# Patient Record
Sex: FEMALE | Race: WHITE | Employment: OTHER | URBAN - METROPOLITAN AREA
[De-identification: names, ages, dates, MRNs, and addresses within clinical notes are randomized per-mention and may not be internally consistent; named-entity substitution may affect disease eponyms.]

---

## 2023-09-19 SDOH — HEALTH STABILITY: PHYSICAL HEALTH: ON AVERAGE, HOW MANY MINUTES DO YOU ENGAGE IN EXERCISE AT THIS LEVEL?: 30 MIN

## 2023-09-19 SDOH — HEALTH STABILITY: PHYSICAL HEALTH: ON AVERAGE, HOW MANY DAYS PER WEEK DO YOU ENGAGE IN MODERATE TO STRENUOUS EXERCISE (LIKE A BRISK WALK)?: 4 DAYS

## 2023-09-19 ASSESSMENT — SOCIAL DETERMINANTS OF HEALTH (SDOH)
WITHIN THE LAST YEAR, HAVE YOU BEEN KICKED, HIT, SLAPPED, OR OTHERWISE PHYSICALLY HURT BY YOUR PARTNER OR EX-PARTNER?: NO
WITHIN THE LAST YEAR, HAVE YOU BEEN AFRAID OF YOUR PARTNER OR EX-PARTNER?: NO
WITHIN THE LAST YEAR, HAVE YOU BEEN HUMILIATED OR EMOTIONALLY ABUSED IN OTHER WAYS BY YOUR PARTNER OR EX-PARTNER?: NO
WITHIN THE LAST YEAR, HAVE TO BEEN RAPED OR FORCED TO HAVE ANY KIND OF SEXUAL ACTIVITY BY YOUR PARTNER OR EX-PARTNER?: NO

## 2023-09-20 ENCOUNTER — OFFICE VISIT (OUTPATIENT)
Dept: ORTHOPEDIC SURGERY | Age: 63
End: 2023-09-20
Payer: OTHER GOVERNMENT

## 2023-09-20 VITALS — HEIGHT: 66 IN | BODY MASS INDEX: 42.59 KG/M2 | WEIGHT: 265 LBS

## 2023-09-20 DIAGNOSIS — M25.511 RIGHT SHOULDER PAIN, UNSPECIFIED CHRONICITY: Primary | ICD-10-CM

## 2023-09-20 PROCEDURE — 23600 CLTX PROX HUMRL FX W/O MNPJ: CPT | Performed by: ORTHOPAEDIC SURGERY

## 2023-09-20 PROCEDURE — 99203 OFFICE O/P NEW LOW 30 MIN: CPT | Performed by: ORTHOPAEDIC SURGERY

## 2023-09-25 ENCOUNTER — TELEPHONE (OUTPATIENT)
Dept: ORTHOPEDIC SURGERY | Age: 63
End: 2023-09-25

## 2023-09-25 NOTE — TELEPHONE ENCOUNTER
Pt called in regarding the exercises Dr. Margy Mina wanted her to do with her arm.         Please call pt back @ [phone]

## 2023-09-27 ENCOUNTER — OFFICE VISIT (OUTPATIENT)
Dept: ORTHOPEDIC SURGERY | Age: 63
End: 2023-09-27

## 2023-09-27 VITALS — WEIGHT: 265 LBS | BODY MASS INDEX: 42.59 KG/M2 | HEIGHT: 66 IN

## 2023-09-27 DIAGNOSIS — S42.201A CLOSED FRACTURE OF PROXIMAL END OF RIGHT HUMERUS, UNSPECIFIED FRACTURE MORPHOLOGY, INITIAL ENCOUNTER: ICD-10-CM

## 2023-09-27 DIAGNOSIS — S42.201A CLOSED FRACTURE OF PROXIMAL END OF RIGHT HUMERUS, UNSPECIFIED FRACTURE MORPHOLOGY, INITIAL ENCOUNTER: Primary | ICD-10-CM

## 2023-09-27 DIAGNOSIS — M25.511 RIGHT SHOULDER PAIN, UNSPECIFIED CHRONICITY: Primary | ICD-10-CM

## 2023-09-27 PROCEDURE — 99024 POSTOP FOLLOW-UP VISIT: CPT | Performed by: ORTHOPAEDIC SURGERY

## 2023-09-27 RX ORDER — IBUPROFEN 800 MG/1
800 TABLET ORAL 2 TIMES DAILY PRN
Qty: 28 TABLET | Refills: 0 | Status: SHIPPED | OUTPATIENT
Start: 2023-09-27

## 2023-09-27 RX ORDER — IBUPROFEN 600 MG/1
TABLET ORAL
COMMUNITY
Start: 2023-09-12

## 2023-09-27 NOTE — PROGRESS NOTES
HPI: Ms. Kristina Flores is a 72-year-old lady who is approximately 2 weeks out from a closed right proximal humerus fracture. She indicates that she is doing relatively well. She has started doing her pendulum exercises which has been helping. Physical examination:  Evaluation patient's right shoulder and upper extremity demonstrates persistent swelling. Sensation is grossly intact light touch in all dermatomes. 2+ radial pulse with brisk cap refill in fingers. Imaging studies:  4 x-ray views of the right shoulder completed on 9/27/2023 were reviewed independently demonstrating no obvious dislocation or subluxation. There are remains fracture through the surgical neck and greater tuberosity with mild displacement and some impaction. This remains unchanged compared to previous x-rays. Impression and plan: Ms. Kristina Flores is a 72-year-old lady approximately 2 weeks out from a closed right proximal humerus fracture. She appears to be stable and remains in acceptable alignment. Consequently recommend to forge ahead with continued conservative management. She may continue to perform her daily pendulum exercises and her sling wear. I will see her back for reevaluation in 2 weeks but she was encouraged to return or call earlier with questions or concerns. Of note a prescription of ibuprofen was sent to her pharmacy.

## 2023-09-28 NOTE — TELEPHONE ENCOUNTER
Pt no longer has questions regarding exercises as pt was seen in office by Dr. Rizwan Potter on 9/27/23.

## 2023-10-11 ENCOUNTER — OFFICE VISIT (OUTPATIENT)
Dept: ORTHOPEDIC SURGERY | Age: 63
End: 2023-10-11

## 2023-10-11 VITALS — BODY MASS INDEX: 42.59 KG/M2 | WEIGHT: 265 LBS | RESPIRATION RATE: 14 BRPM | HEIGHT: 66 IN

## 2023-10-11 DIAGNOSIS — S42.201D CLOSED FRACTURE OF PROXIMAL END OF RIGHT HUMERUS WITH ROUTINE HEALING, UNSPECIFIED FRACTURE MORPHOLOGY, SUBSEQUENT ENCOUNTER: Primary | ICD-10-CM

## 2023-10-11 DIAGNOSIS — M25.511 RIGHT SHOULDER PAIN, UNSPECIFIED CHRONICITY: ICD-10-CM

## 2023-10-11 PROCEDURE — 99024 POSTOP FOLLOW-UP VISIT: CPT | Performed by: ORTHOPAEDIC SURGERY

## 2023-10-12 NOTE — PROGRESS NOTES
HPI: Ms. Saul Diaz is a 62-year-old lady who is approximately 1 month out from a closed right proximal humerus fracture. Indicates that she is doing fairly well. Her pain has gradually gotten better. She is kept up with her pendulum exercises and sling immobilization. Physical examination:  Evaluation of patient's right shoulder and upper extremity demonstrates mild diffuse swelling. 2+ radial pulse with brisk cap refill in her fingers. She can actively flex, extend, abduct, and adduct all of her fingers. Imaging studies:  4 x-ray views of the right shoulder completed on 10/11/2023 were reviewed independently demonstrating a proximal humerus fracture through the surgical neck with some varus angulation remaining unchanged in alignment compared to earlier x-rays. No obvious dislocation or subluxation. No significant callus formation noted at this time. Impression and plan: Ms. Saul Diaz is a 62-year-old lady approximately 1 month out from a closed right proximal humerus fracture. This is currently being managed conservatively. She is doing relatively well at this time. Today she was instructed to discontinue use of her sling and may start using this arm for light activities of daily living avoiding any lifting pushing or pulling more than 1 to 2 pounds at the most.  A prescription for therapy was provided to begin working on her range of motion. I will see her back for reevaluation in another month but she was encouraged to return or call earlier with any questions or concerns.

## 2023-11-08 ENCOUNTER — OFFICE VISIT (OUTPATIENT)
Dept: ORTHOPEDIC SURGERY | Age: 63
End: 2023-11-08

## 2023-11-08 VITALS — RESPIRATION RATE: 14 BRPM | HEIGHT: 66 IN | BODY MASS INDEX: 42.59 KG/M2 | WEIGHT: 265 LBS

## 2023-11-08 DIAGNOSIS — S42.201D CLOSED FRACTURE OF PROXIMAL END OF RIGHT HUMERUS WITH ROUTINE HEALING, UNSPECIFIED FRACTURE MORPHOLOGY, SUBSEQUENT ENCOUNTER: Primary | ICD-10-CM

## 2023-11-08 PROCEDURE — 99024 POSTOP FOLLOW-UP VISIT: CPT | Performed by: ORTHOPAEDIC SURGERY

## 2023-11-08 NOTE — PROGRESS NOTES
HPI: Ms. Ivett Rodriguze is a 61-year-old lady approximately 2 months out from a right proximal humerus fracture. We are currently managing this conservatively. She indicates that she is doing well noticing gradual improvement in her pain. She has been going to physical therapy and this is going well for her. Physical examination:  Evaluation patient's right shoulder and upper extremity demonstrates intact skin without warmth erythema or notable swelling. She has approximately 100 degrees of shoulder elevation and about 90 degrees of abduction at this time. She has a 2+ radial pulse with brisk capillary refill in her fingers. Imaging studies:  4 x-ray views of the right shoulder completed on 9/27/2023 were reviewed independently demonstrating fracture through the surgical neck and greater tuberosity with mild varus angulation. This remains unchanged compared to previous x-rays. No significant callus formation appreciated across the fracture site at this time. Impression and plan: Ms. Ivett Rodriguez is a 61-year-old lady approximately 2 months out from a closed right proximal humerus fracture. She is doing fairly well clinically and radiographically at this time. She may continue to use this arm for light activities of daily living. I encouraged her to also keep up with physical therapy. A new prescription was provided so that he can start working with her on some light progressive strengthening in addition to her range of motion. I will see her back for reevaluation in another month but she was encouraged to return or call earlier with any questions or concerns.

## 2023-12-06 ENCOUNTER — OFFICE VISIT (OUTPATIENT)
Dept: ORTHOPEDIC SURGERY | Age: 63
End: 2023-12-06

## 2023-12-06 VITALS — RESPIRATION RATE: 14 BRPM | HEIGHT: 66 IN | BODY MASS INDEX: 42.59 KG/M2 | WEIGHT: 265 LBS

## 2023-12-06 DIAGNOSIS — S42.201D CLOSED FRACTURE OF PROXIMAL END OF RIGHT HUMERUS WITH ROUTINE HEALING, UNSPECIFIED FRACTURE MORPHOLOGY, SUBSEQUENT ENCOUNTER: Primary | ICD-10-CM

## 2023-12-06 DIAGNOSIS — M25.531 RIGHT WRIST PAIN: ICD-10-CM

## 2023-12-06 NOTE — PROGRESS NOTES
HPI: Ms. Jonathon Whitaker is a 80-year-old lady approximately 3 months out from a right proximal humerus fracture. She says she is doing very well at this time having minimal pain in her shoulder. Therapy has been going well for her. Physical examination:  Evaluation patient's right shoulder and upper extremity demonstrates intact skin without warmth erythema or notable swelling. She has approximately 100 degrees of shoulder elevation 85 degrees of abduction and external rotation to 60 degrees. She has a 2+ radial pulse with brisk capillary refill in her fingers. Imaging studies:  4 x-ray views of the right shoulder completed on 12/6/2023 were reviewed independently demonstrating fracture through the surgical neck and greater tuberosity with mild varus angulation which remains unchanged in terms of alignment compared to earlier x-rays. There does appear to be some interval consolidation across the fracture site. Impression and plan: Ms. Jonathon Whitaker is a 80-year-old lady approximately 3 months out from a closed right proximal humerus fracture. She continues to do fairly well at this time. She was encouraged to keep up with physical therapy working on her motion and gradual progressive strengthening. She can continue to increase use of this arm as she feels comfortable and I will see her back for reevaluation in 3 months but she was encouraged to return or call earlier with questions or concerns.

## 2024-03-06 ENCOUNTER — OFFICE VISIT (OUTPATIENT)
Dept: ORTHOPEDIC SURGERY | Age: 64
End: 2024-03-06
Payer: OTHER GOVERNMENT

## 2024-03-06 VITALS — RESPIRATION RATE: 14 BRPM | WEIGHT: 265 LBS | HEIGHT: 66 IN | BODY MASS INDEX: 42.59 KG/M2

## 2024-03-06 DIAGNOSIS — S42.201D CLOSED FRACTURE OF PROXIMAL END OF RIGHT HUMERUS WITH ROUTINE HEALING, UNSPECIFIED FRACTURE MORPHOLOGY, SUBSEQUENT ENCOUNTER: Primary | ICD-10-CM

## 2024-03-06 PROCEDURE — 99212 OFFICE O/P EST SF 10 MIN: CPT | Performed by: ORTHOPAEDIC SURGERY

## 2024-03-08 NOTE — PROGRESS NOTES
HPI: Ms. Nicolas is a 63-year-old lady who is now approximately 6 months out from a closed right proximal humerus fracture.  She states that her shoulder feels good.  She cannot quite untie her bra yet behind her back but otherwise has no complaints.  She denies having any pain rating her pain is a 0/10.    Physical examination:  Evaluation of patient's right shoulder and upper extremity demonstrates intact skin without warmth erythema or notable swelling.  She is nontender to palpation diffusely about the shoulder.  She has 130 degrees of active shoulder elevation with 105 degrees of abduction 75 degrees of external rotation and internal Tatian L4.  She has 5/5 muscle strength with resisted deltoid, supraspinatus, external rotation and internal rotation testing.    Imaging studies: 4 x-ray views of the right shoulder completed on 3/6/2024 were reviewed independently demonstrating an impacted proximal humerus fracture through the surgical neck with mild varus alignment.  This remains unchanged compared to previous x-rays.  There appears to be complete consolidation across the fracture site.  No obvious dislocation or subluxation.    Impression and plan: Ms. Nicolas is a 63-year-old lady approximately 6 months out from a closed right proximal humerus fracture.  She appears to be healed appropriately both clinically and radiographically.  At this time she was encouraged to keep up with her home exercise program as I believe she will likely still see some improvement functionally over the course of the next 3 to 6 months.  She may continue to use this arm as she feels comfortable for her regular activities.  I will see her back in my clinic as needed but she may certainly return or call at anytime with questions or concerns